# Patient Record
Sex: MALE | Race: OTHER | NOT HISPANIC OR LATINO | URBAN - METROPOLITAN AREA
[De-identification: names, ages, dates, MRNs, and addresses within clinical notes are randomized per-mention and may not be internally consistent; named-entity substitution may affect disease eponyms.]

---

## 2021-06-07 ENCOUNTER — EMERGENCY (EMERGENCY)
Facility: HOSPITAL | Age: 56
LOS: 0 days | Discharge: HOME | End: 2021-06-07
Attending: EMERGENCY MEDICINE | Admitting: EMERGENCY MEDICINE
Payer: OTHER MISCELLANEOUS

## 2021-06-07 VITALS
TEMPERATURE: 98 F | HEART RATE: 91 BPM | SYSTOLIC BLOOD PRESSURE: 144 MMHG | DIASTOLIC BLOOD PRESSURE: 89 MMHG | WEIGHT: 199.96 LBS | RESPIRATION RATE: 15 BRPM | OXYGEN SATURATION: 98 %
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
## 2021-06-07 DIAGNOSIS — R51.9 HEADACHE, UNSPECIFIED: ICD-10-CM

## 2021-06-07 DIAGNOSIS — M54.2 CERVICALGIA: ICD-10-CM

## 2021-06-07 DIAGNOSIS — S09.90XA UNSPECIFIED INJURY OF HEAD, INITIAL ENCOUNTER: ICD-10-CM

## 2021-06-07 DIAGNOSIS — M54.5 LOW BACK PAIN: ICD-10-CM

## 2021-06-07 DIAGNOSIS — Z23 ENCOUNTER FOR IMMUNIZATION: ICD-10-CM
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
## 2021-06-07 DIAGNOSIS — S20.412A ABRASION OF LEFT BACK WALL OF THORAX, INITIAL ENCOUNTER: ICD-10-CM
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
## 2021-06-07 PROCEDURE — 72128 CT CHEST SPINE W/O DYE: CPT | Mod: 26,MA

## 2021-06-07 PROCEDURE — 70450 CT HEAD/BRAIN W/O DYE: CPT | Mod: 26,MA

## 2021-06-07 PROCEDURE — 72131 CT LUMBAR SPINE W/O DYE: CPT | Mod: 26,MA

## 2021-06-07 PROCEDURE — 72125 CT NECK SPINE W/O DYE: CPT | Mod: 26,MA

## 2021-06-07 PROCEDURE — 74177 CT ABD & PELVIS W/CONTRAST: CPT | Mod: 26,MA

## 2021-06-07 PROCEDURE — 99285 EMERGENCY DEPT VISIT HI MDM: CPT

## 2021-06-07 RX ORDER — METHOCARBAMOL 500 MG/1
750 TABLET, FILM COATED ORAL ONCE
Refills: 0 | Status: COMPLETED | OUTPATIENT
Start: 2021-06-07 | End: 2021-06-07

## 2021-06-07 RX ORDER — METHOCARBAMOL 500 MG/1
2 TABLET, FILM COATED ORAL
Qty: 42 | Refills: 0
Start: 2021-06-07 | End: 2021-06-13

## 2021-06-07 RX ORDER — KETOROLAC TROMETHAMINE 30 MG/ML
30 SYRINGE (ML) INJECTION ONCE
Refills: 0 | Status: DISCONTINUED | OUTPATIENT
Start: 2021-06-07 | End: 2021-06-07

## 2021-06-07 RX ORDER — TETANUS TOXOID, REDUCED DIPHTHERIA TOXOID AND ACELLULAR PERTUSSIS VACCINE, ADSORBED 5; 2.5; 8; 8; 2.5 [IU]/.5ML; [IU]/.5ML; UG/.5ML; UG/.5ML; UG/.5ML
0.5 SUSPENSION INTRAMUSCULAR ONCE
Refills: 0 | Status: COMPLETED | OUTPATIENT
Start: 2021-06-07 | End: 2021-06-07

## 2021-06-07 RX ADMIN — METHOCARBAMOL 750 MILLIGRAM(S): 500 TABLET, FILM COATED ORAL at 17:44

## 2021-06-07 RX ADMIN — Medication 30 MILLIGRAM(S): at 17:44

## 2021-06-07 RX ADMIN — Medication 30 MILLIGRAM(S): at 18:15

## 2021-06-07 RX ADMIN — TETANUS TOXOID, REDUCED DIPHTHERIA TOXOID AND ACELLULAR PERTUSSIS VACCINE, ADSORBED 0.5 MILLILITER(S): 5; 2.5; 8; 8; 2.5 SUSPENSION INTRAMUSCULAR at 20:35

## 2021-06-07 NOTE — ED PROVIDER NOTE - NS ED ROS FT
Constitutional:  No fevers or chills.  Eyes:  No visual changes, eye pain, or discharge.  ENT:  No hearing changes. No sore throat.  Neck:  No neck pain or stiffness.  Cardiac:  No CP or edema.  Resp:  No cough or SOB. No hemoptysis.   GI:  No nausea, vomiting, diarrhea, or abdominal pain.  :  No dysuria, frequency, or hematuria.  MSK:  No myalgias or joint pain/swelling.  Neuro: (+) posterior scalp pain.  no headache, dizziness, or weakness.  Skin:  No skin rash.

## 2021-06-07 NOTE — ED PROVIDER NOTE - PHYSICAL EXAMINATION
PHYSICAL EXAM: I have reviewed current vital signs.  GENERAL: NAD, well-nourished; well-developed.  HEAD:  Normocephalic, atraumatic.  EYES: EOMI, PERRL, conjunctiva and sclera clear.  ENT: MMM, no erythema/exudates.  NECK: Supple, no JVD.  CHEST/LUNG: Clear to auscultation bilaterally; no wheezes, rales, or rhonchi.  HEART: Regular rate and rhythm, normal S1 and S2; no murmurs, rubs, or gallops.  ABDOMEN: Soft, nontender, nondistended.  EXTREMITIES:  2+ peripheral pulses; no clubbing, cyanosis, or edema.  PSYCH: Cooperative, appropriate, normal mood and affect.  NEUROLOGY: A&O x 3. Motor 5/5. Sensory intact. No focal neurological deficits. CN II - XII intact. (-) dysmetria, facial droop, pronator drift.  patient has difficulty ambulating due to back pain. positive straight leg raise test.  left L2-L5 paraspinal tenderness. no central spinal tenderness.  SKIN: Warm and dry.  multiple abrasions to the back non bleeding.

## 2021-06-07 NOTE — ED PROVIDER NOTE - CARE PROVIDER_API CALL
Rustam De La Torre)  Orthopaedic Surgery  [address] [place] [address]  Phone: [phone]  Fax: [phone]  Follow Up Time: 1-3 Days    Tyshawn Larios)  Neurosurgery  [facility] [place] [address]  Phone: [phone]  Fax: [phone]  Follow Up Time: 1-3 Days

## 2021-06-07 NOTE — ED PROVIDER NOTE - CARE PROVIDERS DIRECT ADDRESSES
[email or web address]

## 2021-06-07 NOTE — ED PROVIDER NOTE - ATTENDING CONTRIBUTION TO CARE
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 pain is sharp, 8/10, constant, worse with movement better at rest, non-radiating. no loc, not on any AC. Recalls all events. No fever, chills, n/v, cp,  pleuritic cp, sob, palpitations, diaphoresis, cough, chest wall/rib pain, clavicular pain, ankle pain, knee pain, shoulder pain, wrist pain, no hip pain, no ha/lh/dizziness, numbness/tingling, neck stiffness, abd pain,  melena/brbpr, urinary symptoms, edema, calf pain/swelling/erythema, sick contacts, recent travel . no hx of ivda, no urinary or bowel incontinence, no saddle paresthesias. GCS 15.    On Exam:  Vital Signs: I have reviewed the initial vital signs.  Constitutional: WDWN uncomfortable 2/2 to back pain.  HEAD: No signs of basilar skull fracture.  Integumentary: No rash. No laceration, ecchymoses or swelling. Superficial abrasion to upper back.  EYES: No periorbital swelling/ecchymoses. PERRL, EOM intact. No nystagmus. No subconjunctival hemorrhage.   ENT: MMM. No rhinorrhea/otorrhea. No epistaxis,  No septal hematoma. No mastoid ecchymoses. No intraoral bleeding, No loose or craked teeth, no active bleeding. No malocclusion. No TMJ pain.  NECK: Supple, Pain to palpation to paraspinal neck region. No palpable shelves or step-offs.  BACK: No spinous tenderness. No palpable shelves or step-offs. Pain to palpation to L paraspinal thoracic region and b/l lumbar paraspinal region R>L, (+) SLR b/l, no foot droop,   Cardiovascular: RRR, radial pulses 2/4 b/l. dp and pt pulses 2/4/ b/l. No pain to palpation to chest wall.  Respiratory: BS present b/l, ctabl, no wheezing or crackles, no stridor. No pain to palpation to ribs b/l. No crepitus. No subq emphysema.   Gastrointestinal: BS present throughout all 4 quadrants, soft, nd, nt. no r/g.  Musculoskeletal: FROM of all extremities. No bony tenderness. No pain to palpation to clavicles, no obvious bony deformities. No hip pain. No short leg. No internal or external rotation of LE  Neurologic: GCS 15. CN II-XII intact, no facial droop or slurring of speech. Motor 5/5 and sensation intact throughout upper and lower extremities. (-) Pronator. (-) Romberg. 2+ reflexes, pt able to ambulate but with pain. 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 pain is sharp, 8/10, constant, worse with movement better at rest, non-radiating. no loc, not on any AC. Recalls all events. No fever, chills, n/v, cp,  pleuritic cp, sob, palpitations, diaphoresis, cough, chest wall/rib pain, clavicular pain, ankle pain, knee pain, shoulder pain, wrist pain, no hip pain, no ha/lh/dizziness, numbness/tingling, neck stiffness, abd pain,  melena/brbpr, urinary symptoms, edema, calf pain/swelling/erythema, sick contacts, recent travel . no hx of ivda, no urinary or bowel incontinence, no saddle paresthesias. GCS 15.    On Exam:  Vital Signs: I have reviewed the initial vital signs.  Constitutional: WDWN uncomfortable 2/2 to back pain.  HEAD: No signs of basilar skull fracture.  Integumentary: No rash. No laceration, ecchymoses or swelling. Superficial abrasion to upper back.  EYES: No periorbital swelling/ecchymoses. PERRL, EOM intact. No nystagmus. No subconjunctival hemorrhage.   ENT: MMM. No rhinorrhea/otorrhea. No epistaxis,  No septal hematoma. No mastoid ecchymoses. No intraoral bleeding, No loose or craked teeth, no active bleeding. No malocclusion. No TMJ pain.  NECK: Supple, Pain to palpation to paraspinal neck region. No palpable shelves or step-offs.  BACK: No spinous tenderness. No palpable shelves or step-offs. Pain to palpation to L paraspinal thoracic region and b/l lumbar paraspinal region R>L, (+) SLR b/l, no foot droop,   Cardiovascular: RRR, radial pulses 2/4 b/l. dp and pt pulses 2/4/ b/l. No pain to palpation to chest wall.  Respiratory: BS present b/l, ctabl, no wheezing or crackles, no stridor. No pain to palpation to ribs b/l. No crepitus. No subq emphysema.   Gastrointestinal: BS present throughout all 4 quadrants, soft, nd, nt. no r/g.  Musculoskeletal: FROM of all extremities. No bony tenderness. No pain to palpation to clavicles, no obvious bony deformities. No hip pain. No short leg. No internal or external rotation of LE  Neurologic: GCS 15. CN II-XII intact, no facial droop or slurring of speech. Motor 5/5 and sensation intact throughout upper and lower extremities. (-) Pronator. (-) Romberg. 2+ reflexes, pt able to ambulate but with pain.

## 2021-06-07 NOTE — ED PROVIDER NOTE - CARE PLAN
Assessment and plan of treatment:	Plan: Pain control tetanus shot, ct head, neck thoracic and lumbar spine, reassess.   Principal Discharge DX:	CHI (closed head injury)  Assessment and plan of treatment:	Plan: Pain control tetanus shot, ct head, neck thoracic and lumbar spine, reassess.  Secondary Diagnosis:	Back pain

## 2021-06-07 NOTE — ED PROVIDER NOTE - PROVIDER TOKENS
PROVIDER:[TOKEN:[[id number]],FOLLOWUP:[1-3 Days]],PROVIDER:[TOKEN:[[id number]],FOLLOWUP:[1-3 Days]]

## 2021-06-07 NOTE — ED PROVIDER NOTE - NSFOLLOWUPCLINICSTOKEN_GEN_ALL_ED_FT
247540:1-3 Days|| ||00\01||False;203471:1-3 Days|| ||00\01||False;424532:1-3 Days|| ||00\01||False;426567:1-3 Days|| ||00\01||False;

## 2021-06-07 NOTE — ED PROVIDER NOTE - NSFOLLOWUPCLINICS_GEN_ALL_ED_FT
[facility]cussion [facility]  Concussion Program  [address], [place]   Phone: [phone]  Fax:   Follow Up Time: 1-3 Days    [facility] ([facility])  Rehabilitation  [facility] [address] [place] [address]  Phone: [phone]  Fax:   Follow Up Time: 1-3 Days    [facility] ([facility])  Rehabilitation  [address] [place] [id number]  Phone: [phone]  Fax:   Follow Up Time: 1-3 Days    [facility]  Orthpedic  [address], [place]   Phone: [phone]  Fax:   Follow Up Time: 1-3 Days

## 2021-06-07 NOTE — ED PROVIDER NOTE - CLINICAL SUMMARY MEDICAL DECISION MAKING FREE TEXT BOX
pt presented s/p fall, complaining of back pain, report hit head, imaging reviewed, including imaging of ct with contrast, pt gcs 15, feeling better, discussed with patient need for possible MRI for further work up for their symptoms and how this was not possible in the Emergency Department at this time.  Follow up being provided to have further evaluation for this test given. understands symptomatic treatment, signs and symptoms to return for, will follow up with pmd, rehabs, concussion specialist, and neurosurgery as discussed.;

## 2021-06-07 NOTE — ED PROVIDER NOTE - PATIENT PORTAL LINK FT
You can access the FollowMyHealth Patient Portal offered by [facility] by registering at the following website: [email or web address]. By joining [facility]’s FollowMyHealth portal, you will also be able to view your health information using other applications (apps) compatible with our system.

## 2021-06-07 NOTE — ED PROVIDER NOTE - NSFOLLOWUPINSTRUCTIONS_ED_ALL_ED_FT
Pt is seen for Fall/Back pain.  No syncopal episode is reported.  Didn't hit Head.   Found on Floor.  No seizure activity is reported.  Rectal temp was degree F.  PNA/UTI?  No signs of meningitis.  Limited but non focal exam.  Pt is Dehydrated with BUN/Cr -   Rhabdomyolysis. CPK -   X-Ray Lumbar spine -   X-Ray Thoracic Spine -   CT Head - No acute pathology.  No signs of CVA.  Symptoms likely secondary to vasovagal/Orthostatic Hypotension.  D/D Include cardiac arrhythmia/Valvular dysfunction.  D/w ED physician,    D/w covering nurse.  D/w Patient/wife daughter son at bedside. Questions answered.  Would continue to follow.      Closed Head Injury    A closed head injury is an injury to your head that may or may not involve a traumatic brain injury (TBI). Symptoms of TBI can be short or long lasting and include headache, dizziness, interference with memory or speech, fatigue, confusion, changes in sleep, mood changes, nausea, depression/anxiety, and dulling of senses. Make sure to obtain proper rest which includes getting plenty of sleep, avoiding excessive visual stimulation, and avoiding activities that may cause physical or mental stress. Avoid any situation where there is potential for another head injury, including sports.    SEEK IMMEDIATE MEDICAL CARE IF YOU HAVE ANY OF THE FOLLOWING SYMPTOMS: unusual drowsiness, vomiting, severe dizziness, seizures, lightheadedness, muscular weakness, different pupil sizes, visual changes, or clear or bloody discharge from your ears or nose.

## 2021-06-07 NOTE — ED PROVIDER NOTE - PROGRESS NOTE DETAILS
ED Attending YUNG Parrish  pt feeling better after meds, pending imaging results, pt aware, will continue to monitor and reassess. CT lumbar findings noted.  has hypodensity on right posterior kidney will get CT abd and pelvis with IV contrast to better visualize as well as get labs. patient notified agreeable to plan. ED Attending YUNG Parrish   pt aware of ct results:"No evidence of renal infarction or hematoma.", ambulatory in ed, gcs 15, aware of follow up with pmd, rehab, concussion specialist, and neursurgery, pt aware, copy of results provided, will follow up.

## 2021-06-07 NOTE — ED PROVIDER NOTE - OBJECTIVE STATEMENT
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   patient had difficulty ambulating after incident due to back pain.  denies lose of motor function or sensation no LOC.  Denies dizziness, weakness, fever, cough, CP, SOB, palpitations, Abd pain, N/V, Leg swelling, dysuria, hematuria, dark or bloody stool.

## 2021-09-01 NOTE — ED ADULT NURSE NOTE - CAS ELECT INFOMATION PROVIDED
Know, this is to be refilled by PCP we only lowered the dose, which is likely why the prescription says our name.  Please just inform her that this medication is never ordered by our department, most often by the PCP or Neurology.   DC instructions

## 2025-02-06 NOTE — ED PROVIDER NOTE - NSDCPRINTRESULTS_ED_ALL_ED
q   Health Maintenance       Lung Cancer Screening (Yearly)  Order placed this encounter    Respiratory Syncytial Virus (RSV) Vaccine 60+ (1 - Risk 60-74 years 1-dose series)  Never done    DTaP/Tdap/Td Vaccine (2 - Td or Tdap)  Overdue since 2/6/2024    Influenza Vaccine (1)  Never done    COVID-19 Vaccine (4 - 2024-25 season)  Overdue since 9/1/2024    Medicare Advantage- Medicare Wellness Visit (Yearly - January to December)  Due since 1/1/2025           Following review of the above:  Patient is not proceeding with: Lung Cancer Screening, COVID-19, Dtap/Tdap/Td, and Influenza    Note: Refer to final orders and clinician documentation.       Patient requests all Lab and Radiology Results on their Discharge Instructions